# Patient Record
Sex: FEMALE | Race: WHITE
[De-identification: names, ages, dates, MRNs, and addresses within clinical notes are randomized per-mention and may not be internally consistent; named-entity substitution may affect disease eponyms.]

---

## 2017-02-15 ENCOUNTER — HOSPITAL (OUTPATIENT)
Dept: OTHER | Age: 62
End: 2017-02-15
Attending: UROLOGY

## 2018-02-27 PROBLEM — N18.30 CKD (CHRONIC KIDNEY DISEASE) STAGE 3, GFR 30-59 ML/MIN (HCC): Status: ACTIVE | Noted: 2017-01-11

## 2018-02-27 PROBLEM — I73.00 RAYNAUD'S PHENOMENON WITHOUT GANGRENE: Status: ACTIVE | Noted: 2017-01-11

## 2018-02-27 PROBLEM — Z78.9 STATIN INTOLERANCE: Status: ACTIVE | Noted: 2017-07-20

## 2018-02-27 PROBLEM — M19.90 OSTEOARTHRITIS, UNSPECIFIED OSTEOARTHRITIS TYPE, UNSPECIFIED SITE: Status: ACTIVE | Noted: 2017-01-11

## 2019-06-04 PROCEDURE — 84425 ASSAY OF VITAMIN B-1: CPT | Performed by: INTERNAL MEDICINE

## 2019-08-19 PROCEDURE — 36415 COLL VENOUS BLD VENIPUNCTURE: CPT | Performed by: INTERNAL MEDICINE

## 2019-08-19 PROCEDURE — 86803 HEPATITIS C AB TEST: CPT | Performed by: INTERNAL MEDICINE

## 2021-04-10 DIAGNOSIS — Z23 NEED FOR VACCINATION: ICD-10-CM

## 2022-03-01 PROBLEM — N18.30 CKD (CHRONIC KIDNEY DISEASE) STAGE 3, GFR 30-59 ML/MIN (HCC): Status: RESOLVED | Noted: 2017-01-11 | Resolved: 2022-03-01

## 2022-03-01 PROBLEM — E66.09 CLASS 1 OBESITY DUE TO EXCESS CALORIES WITH SERIOUS COMORBIDITY IN ADULT, UNSPECIFIED BMI: Status: ACTIVE | Noted: 2022-03-01

## 2022-03-01 PROBLEM — Z96.652 HISTORY OF LEFT KNEE REPLACEMENT: Status: ACTIVE | Noted: 2022-03-01

## 2022-03-01 PROBLEM — I82.412 ACUTE DEEP VEIN THROMBOSIS (DVT) OF FEMORAL VEIN OF LEFT LOWER EXTREMITY (HCC): Status: ACTIVE | Noted: 2022-03-01

## 2023-02-06 ENCOUNTER — LAB ENCOUNTER (OUTPATIENT)
Dept: LAB | Age: 68
End: 2023-02-06
Attending: INTERNAL MEDICINE
Payer: MEDICARE

## 2023-02-06 DIAGNOSIS — Z01.812 ENCOUNTER FOR PREOPERATIVE SCREENING LABORATORY TESTING FOR COVID-19 VIRUS: ICD-10-CM

## 2023-02-06 DIAGNOSIS — Z20.822 ENCOUNTER FOR PREOPERATIVE SCREENING LABORATORY TESTING FOR COVID-19 VIRUS: ICD-10-CM

## 2023-02-06 RX ORDER — LEVOTHYROXINE SODIUM 0.05 MG/1
50 TABLET ORAL
COMMUNITY

## 2023-02-06 RX ORDER — TERAZOSIN 2 MG/1
2 CAPSULE ORAL NIGHTLY
COMMUNITY

## 2023-02-06 RX ORDER — ASPIRIN 81 MG/1
81 TABLET ORAL DAILY
COMMUNITY

## 2023-02-07 LAB — SARS-COV-2 RNA RESP QL NAA+PROBE: NOT DETECTED

## 2023-02-08 ENCOUNTER — ANESTHESIA EVENT (OUTPATIENT)
Dept: ENDOSCOPY | Facility: HOSPITAL | Age: 68
End: 2023-02-08
Payer: MEDICARE

## 2023-02-08 ENCOUNTER — HOSPITAL ENCOUNTER (OUTPATIENT)
Facility: HOSPITAL | Age: 68
Setting detail: HOSPITAL OUTPATIENT SURGERY
Discharge: HOME | End: 2023-02-08
Attending: INTERNAL MEDICINE | Admitting: INTERNAL MEDICINE
Payer: MEDICARE

## 2023-02-08 ENCOUNTER — APPOINTMENT (OUTPATIENT)
Dept: GENERAL RADIOLOGY | Facility: HOSPITAL | Age: 68
End: 2023-02-08
Attending: INTERNAL MEDICINE
Payer: MEDICARE

## 2023-02-08 ENCOUNTER — ANESTHESIA (OUTPATIENT)
Dept: ENDOSCOPY | Facility: HOSPITAL | Age: 68
End: 2023-02-08
Payer: MEDICARE

## 2023-02-08 VITALS
TEMPERATURE: 97 F | HEIGHT: 67 IN | RESPIRATION RATE: 18 BRPM | HEART RATE: 55 BPM | DIASTOLIC BLOOD PRESSURE: 63 MMHG | WEIGHT: 193 LBS | BODY MASS INDEX: 30.29 KG/M2 | OXYGEN SATURATION: 96 % | SYSTOLIC BLOOD PRESSURE: 151 MMHG

## 2023-02-08 DIAGNOSIS — Z20.822 ENCOUNTER FOR PREOPERATIVE SCREENING LABORATORY TESTING FOR COVID-19 VIRUS: Primary | ICD-10-CM

## 2023-02-08 DIAGNOSIS — Z01.812 ENCOUNTER FOR PREOPERATIVE SCREENING LABORATORY TESTING FOR COVID-19 VIRUS: Primary | ICD-10-CM

## 2023-02-08 LAB — GLUCOSE BLD-MCNC: 150 MG/DL (ref 70–99)

## 2023-02-08 PROCEDURE — 0FCD8ZZ EXTIRPATION OF MATTER FROM PANCREATIC DUCT, VIA NATURAL OR ARTIFICIAL OPENING ENDOSCOPIC: ICD-10-PCS | Performed by: INTERNAL MEDICINE

## 2023-02-08 PROCEDURE — 74330 X-RAY BILE/PANC ENDOSCOPY: CPT | Performed by: INTERNAL MEDICINE

## 2023-02-08 PROCEDURE — 0F7D8DZ DILATION OF PANCREATIC DUCT WITH INTRALUMINAL DEVICE, VIA NATURAL OR ARTIFICIAL OPENING ENDOSCOPIC: ICD-10-PCS | Performed by: INTERNAL MEDICINE

## 2023-02-08 PROCEDURE — 82962 GLUCOSE BLOOD TEST: CPT

## 2023-02-08 PROCEDURE — 0FPB8DZ REMOVAL OF INTRALUMINAL DEVICE FROM HEPATOBILIARY DUCT, VIA NATURAL OR ARTIFICIAL OPENING ENDOSCOPIC: ICD-10-PCS | Performed by: INTERNAL MEDICINE

## 2023-02-08 RX ORDER — NICOTINE POLACRILEX 4 MG
30 LOZENGE BUCCAL
Status: DISCONTINUED | OUTPATIENT
Start: 2023-02-08 | End: 2023-02-08

## 2023-02-08 RX ORDER — DEXTROSE MONOHYDRATE 25 G/50ML
50 INJECTION, SOLUTION INTRAVENOUS
Status: DISCONTINUED | OUTPATIENT
Start: 2023-02-08 | End: 2023-02-08

## 2023-02-08 RX ORDER — SODIUM CHLORIDE, SODIUM LACTATE, POTASSIUM CHLORIDE, CALCIUM CHLORIDE 600; 310; 30; 20 MG/100ML; MG/100ML; MG/100ML; MG/100ML
INJECTION, SOLUTION INTRAVENOUS CONTINUOUS
Status: DISCONTINUED | OUTPATIENT
Start: 2023-02-08 | End: 2023-02-08

## 2023-02-08 RX ORDER — EPHEDRINE SULFATE 50 MG/ML
INJECTION INTRAVENOUS AS NEEDED
Status: DISCONTINUED | OUTPATIENT
Start: 2023-02-08 | End: 2023-02-08 | Stop reason: SURG

## 2023-02-08 RX ORDER — LIDOCAINE HYDROCHLORIDE 10 MG/ML
INJECTION, SOLUTION EPIDURAL; INFILTRATION; INTRACAUDAL; PERINEURAL AS NEEDED
Status: DISCONTINUED | OUTPATIENT
Start: 2023-02-08 | End: 2023-02-08 | Stop reason: SURG

## 2023-02-08 RX ORDER — NICOTINE POLACRILEX 4 MG
15 LOZENGE BUCCAL
Status: DISCONTINUED | OUTPATIENT
Start: 2023-02-08 | End: 2023-02-08

## 2023-02-08 RX ORDER — PHENYLEPHRINE HCL 10 MG/ML
VIAL (ML) INJECTION AS NEEDED
Status: DISCONTINUED | OUTPATIENT
Start: 2023-02-08 | End: 2023-02-08 | Stop reason: SURG

## 2023-02-08 RX ORDER — ONDANSETRON 2 MG/ML
4 INJECTION INTRAMUSCULAR; INTRAVENOUS AS NEEDED
Status: DISCONTINUED | OUTPATIENT
Start: 2023-02-08 | End: 2023-02-08

## 2023-02-08 RX ORDER — LEVOFLOXACIN 5 MG/ML
500 INJECTION, SOLUTION INTRAVENOUS ONCE
Status: COMPLETED | OUTPATIENT
Start: 2023-02-08 | End: 2023-02-08

## 2023-02-08 RX ORDER — NALOXONE HYDROCHLORIDE 0.4 MG/ML
80 INJECTION, SOLUTION INTRAMUSCULAR; INTRAVENOUS; SUBCUTANEOUS AS NEEDED
Status: DISCONTINUED | OUTPATIENT
Start: 2023-02-08 | End: 2023-02-08

## 2023-02-08 RX ORDER — LEVOFLOXACIN 5 MG/ML
INJECTION, SOLUTION INTRAVENOUS
Status: DISCONTINUED
Start: 2023-02-08 | End: 2023-02-08

## 2023-02-08 RX ADMIN — SODIUM CHLORIDE, SODIUM LACTATE, POTASSIUM CHLORIDE, CALCIUM CHLORIDE: 600; 310; 30; 20 INJECTION, SOLUTION INTRAVENOUS at 13:14:00

## 2023-02-08 RX ADMIN — EPHEDRINE SULFATE 5 MG: 50 INJECTION INTRAVENOUS at 12:23:00

## 2023-02-08 RX ADMIN — PHENYLEPHRINE HCL 300 MCG: 10 MG/ML VIAL (ML) INJECTION at 12:45:00

## 2023-02-08 RX ADMIN — SODIUM CHLORIDE, SODIUM LACTATE, POTASSIUM CHLORIDE, CALCIUM CHLORIDE: 600; 310; 30; 20 INJECTION, SOLUTION INTRAVENOUS at 12:04:00

## 2023-02-08 RX ADMIN — LEVOFLOXACIN 500 MG: 5 INJECTION, SOLUTION INTRAVENOUS at 12:10:00

## 2023-02-08 RX ADMIN — PHENYLEPHRINE HCL 100 MCG: 10 MG/ML VIAL (ML) INJECTION at 12:50:00

## 2023-02-08 RX ADMIN — PHENYLEPHRINE HCL 100 MCG: 10 MG/ML VIAL (ML) INJECTION at 12:33:00

## 2023-02-08 RX ADMIN — EPHEDRINE SULFATE 5 MG: 50 INJECTION INTRAVENOUS at 12:21:00

## 2023-02-08 RX ADMIN — EPHEDRINE SULFATE 5 MG: 50 INJECTION INTRAVENOUS at 12:27:00

## 2023-02-08 RX ADMIN — LIDOCAINE HYDROCHLORIDE 50 MG: 10 INJECTION, SOLUTION EPIDURAL; INFILTRATION; INTRACAUDAL; PERINEURAL at 11:58:00

## 2023-02-08 NOTE — OPERATIVE REPORT
[facility] OPERATIVE REPORT   PATIENT NAME: Ildefonso Gordon  MRN: [id number]  DATE OF OPERATION: 2/8/2023  PREOPERATIVE DIAGNOSIS: large, dense pancreatic duct stone s/p ESWL yesterday; here for stone extraction  POSTOPERATIVE DIAGNOSIS:    1. Removal of biliary stent   2. Selective pancreatic duct cannulation with redemonstration of large impacted radioopaque stone with passage of wire around the stone   3. Small stone fragments seen after attempts to remove the stone but large stone unable to be extracted despite attempts at balloon and mechanical lithotripsy using the 4mm hurricaine balloon, 1.5cm and 2.0 cm trapezoid basket and 7fr suhendra stent retriever   4. Obstructed, dilated, tortuous pancreatic duct 2nd stone s/p placement of 5fr x 9cm single pigtail stent for pancreatic ductal drainage  PROCEDURE PERFORMED: Endoscopic Retrograde Cholangiopancreatoscopy with attempt at pancreatic stone extraction and placement of pancreatic stent  SEDATION MEDICATIONS: MAC  PREOPERATIVE MEDS:  Levofloxacin 500 mg IVPB;  500cc lactated Ringer's solution IV  INTRAPROCEDURE MEDS:  indomethacin 100 mg Per rectum  PREPROCEDURE ASSESSMENT: The indication for this procedure is to assess for stone extraction. The patient was identified by myself and nursing staff in the exam room. Informed consent was obtained. The patient was seen in clinic and a full H&P was obtained. On brief physical examination, airway is patent. Chest is clear. Heart has regular rate and rhythm. Abdomen is soft, nontender with good bowel sounds. A medication list was taken by nursing today and reviewed by myself. The patient is an ASA grade 2. PROCEDURE NOTE: The procedure was completed without difficulty. The patient tolerated the procedure well. The side viewing duodenoscope was inserted through the mouth and advanced to the level of the duodenum, 3rd portion.   Visualized portion of the esophagus, stomach including antrum, body, fundus and cardiac, and duodenum were normal.  The side-viewing duodenal scope was introduced into the second portion of duodenum. Previously placed biliary stent was removed with a snare. Cannulation of the pancreatic duct was attempted using hydrotome 44 with a preloaded 035\" guidewire. A dark shadow was seen in the head of the pancreas consistent with a radiopaque pancreatic ductal stone. The guidewire had a hard time going around the stone. Contrast injection was performed revealing a normal caliber pancreatic duct going underneath and around the stone. The guidewire was seen looping around the stone in the pancreatic duct. The cannula was advanced further into the pancreatic duct and with aggressive maneuvers, the guidewire was advanced around the stone and into the dilated upstream pancreatic duct. Further contrast injection revealed a markedly tortuous dilated pancreatic duct measuring 8 mm. Leaving the guidewire in the dorsal pancreatic duct towards the tail, a pancreatic sphincterotomy was performed. Small white stone debris was seen coming through the pancreatic duct. Over the guidewire, trapezoid basket 1.5 cm was used to attempt to remove the stone however the basket cannula could not be advanced upstream around the large pancreatic ductal stone. Attempts to do a open basket sweep failed. We could not trap the stone in the basket. We even tried a 2 cm over-the-wire trapezoid basket. The sphincterotomy site was dilated with a 4 mm HurriCaine balloon. After the balloon dilation, excellent drainage of contrast was seen from the pink the balloon catheter was then advanced over the guidewire around the stone and upstream to the stone. A pull-through to attempt to extract the stone failed. At this point a 7 [place] Soehendra stent retriever was used to crush the stone using a rotating maneuver to turn the metallic retriever. We are able to advance the metal retriever pass the stone.   Still the stone was not able to be shaved or removed. At this point a pancreatic stent was attempted to be placed. A 7 Faroese stent could not be passed around the pancreatic stone but a 5 Faroese stent was able to. We successfully placed a 5 [place] M[place] by 9 cm single pigtail pancreatic stent with excellent drainage of contrast noted. Scope was withdrawn from the patient and patient tolerated the procedure well. FINDINGS  1. Large pancreatic duct stone could not be removed. Small fragments of stone debris was seen coming through the papillary orifice confirming that the stone is infected within the pancreatic duct and not within the pancreatic parenchyma. A pancreatic stent was placed for ductal decompression  RECOMMENDATIONS: repeat high dose ESWL with Dr. Sofia Santos. We will repeat ERCP in 4 weeks to attempt to do a spyglass with EHL to remove the pancreatic duct stone  DISCHARGE:  On discharge, the patient was given an after-visit summary detailing the procedure, findings, followup plans, and an updated medication list.     Thank you very much for the consultation. I really appreciate it.     Amos Petersen MD

## 2023-02-08 NOTE — DISCHARGE INSTRUCTIONS
Home Care Instructions for Endoscopic Retrograde Cholangiopancreatography with Sedation    Diet:  - Resume your regular diet as tolerated unless otherwise instructed. - Start with light meals to minimize bloating.  - Do not drink alcohol today. Medication:  - If you have questions about resuming your normal medications, please contact your Primary Care Physician. Activities:  - Take it easy today. Do not return to work today. - Do not drive today. - Do not operate any machinery today (including kitchen equipment). Endoscopic Retrograde Cholangiopancreatography:  - You may have a sore throat for 2-3 days following the exam. This is normal. Gargling with warm salt water (1/2 tsp salt to 1 glass warm water) or using throat lozenges will help. - If you experience any sharp pain in your neck, abdomen or chest, vomiting of blood, oral temperature over 100 degrees Fahrenheit, light-headedness or dizziness, or any other problems, contact your doctor. **If unable to reach your doctor, please go to the [facility] Emergency Room**    - Your referring physician will receive a full report of your examination.  - If you do not hear from your doctor's office within two weeks of your biopsy, please call them for your results. You may be able to see your laboratory results in [employer] between 4 and 7 business days. In some cases, your physician may not have viewed the results before they are released to [address] If you have questions regarding your results contact the physician who ordered the test/exam by phone or via [address] by choosing \"Ask a Medical Question. \"

## 2023-02-08 NOTE — ANESTHESIA POSTPROCEDURE EVALUATION
[address] [facility] Patient Status:  Hospital Outpatient Surgery   Age/Gender 75year old female MRN [id number]   Location [address] [address] Attending Ligia Cevallos MD   Hosp Day # 0 PCP Andrew Blackburn MD       Anesthesia Post-op Note    ENDOSCOPIC RETROGRADE CHOLANGIOPANCREATOGRAPHY with biliary stent removal, sphincterotomy, hurricane balloon dilation to 4mm, mechanical lithrotripsy, trapezoid basket sweep, and pancreatic stent placement    Procedure Summary     Date: 02/08/23 Room / Location: [address] [place] [address] ENDOSCOPY 01 / [facility] ENDOSCOPY    Anesthesia Start: 1153 Anesthesia Stop: 7543    Procedure: ENDOSCOPIC RETROGRADE CHOLANGIOPANCREATOGRAPHY with biliary stent removal, sphincterotomy, hurricane balloon dilation to 4mm, mechanical lithrotripsy, trapezoid basket sweep, and pancreatic stent placement Diagnosis: (PANCREATIC STONE, PANCREATIC DUCT OBSTRUCTION)    Surgeons: Ligia Cevallos MD Anesthesiologist: Ab Briggs MD    Anesthesia Type: MAC ASA Status: 3          Anesthesia Type: MAC    Vitals Value Taken Time   BP 91/64 02/08/23 1314   Temp  02/08/23 1315   Pulse 73 02/08/23 1314   Resp 18 02/08/23 1314   SpO2 100 % 02/08/23 1314   Vitals shown include unvalidated device data. Patient Location: Endoscopy    Anesthesia Type: MAC    Airway Patency: patent    Postop Pain Control: adequate    Mental Status: mildly sedated but able to meaningfully participate in the post-anesthesia evaluation    Nausea/Vomiting: none    Cardiopulmonary/Hydration status: stable euvolemic    Complications: no apparent anesthesia related complications    Postop vital signs: stable    Dental Exam: Unchanged from Preop    Patient to be discharged home when criteria met.

## 2023-03-13 ENCOUNTER — LAB ENCOUNTER (OUTPATIENT)
Dept: LAB | Age: 68
End: 2023-03-13
Attending: INTERNAL MEDICINE
Payer: MEDICARE

## 2023-03-13 DIAGNOSIS — Z01.818 PRE-OP TESTING: ICD-10-CM

## 2023-03-14 LAB — SARS-COV-2 RNA RESP QL NAA+PROBE: NOT DETECTED

## 2023-03-15 ENCOUNTER — ANESTHESIA EVENT (OUTPATIENT)
Dept: ENDOSCOPY | Facility: HOSPITAL | Age: 68
End: 2023-03-15
Payer: MEDICARE

## 2023-03-15 ENCOUNTER — APPOINTMENT (OUTPATIENT)
Dept: GENERAL RADIOLOGY | Facility: HOSPITAL | Age: 68
End: 2023-03-15
Attending: INTERNAL MEDICINE
Payer: MEDICARE

## 2023-03-15 ENCOUNTER — ANESTHESIA (OUTPATIENT)
Dept: ENDOSCOPY | Facility: HOSPITAL | Age: 68
End: 2023-03-15
Payer: MEDICARE

## 2023-03-15 ENCOUNTER — HOSPITAL ENCOUNTER (OUTPATIENT)
Facility: HOSPITAL | Age: 68
Setting detail: HOSPITAL OUTPATIENT SURGERY
Discharge: HOME | End: 2023-03-15
Attending: INTERNAL MEDICINE | Admitting: INTERNAL MEDICINE
Payer: MEDICARE

## 2023-03-15 VITALS
SYSTOLIC BLOOD PRESSURE: 144 MMHG | TEMPERATURE: 98 F | WEIGHT: 190 LBS | DIASTOLIC BLOOD PRESSURE: 63 MMHG | BODY MASS INDEX: 29.47 KG/M2 | RESPIRATION RATE: 11 BRPM | OXYGEN SATURATION: 98 % | HEART RATE: 48 BPM | HEIGHT: 67.5 IN

## 2023-03-15 DIAGNOSIS — Z01.818 PRE-OP TESTING: Primary | ICD-10-CM

## 2023-03-15 LAB — GLUCOSE BLD-MCNC: 120 MG/DL (ref 70–99)

## 2023-03-15 PROCEDURE — 0FCD8ZZ EXTIRPATION OF MATTER FROM PANCREATIC DUCT, VIA NATURAL OR ARTIFICIAL OPENING ENDOSCOPIC: ICD-10-PCS | Performed by: INTERNAL MEDICINE

## 2023-03-15 PROCEDURE — 0F7D8DZ DILATION OF PANCREATIC DUCT WITH INTRALUMINAL DEVICE, VIA NATURAL OR ARTIFICIAL OPENING ENDOSCOPIC: ICD-10-PCS | Performed by: INTERNAL MEDICINE

## 2023-03-15 PROCEDURE — 0FPD8DZ REMOVAL OF INTRALUMINAL DEVICE FROM PANCREATIC DUCT, VIA NATURAL OR ARTIFICIAL OPENING ENDOSCOPIC: ICD-10-PCS | Performed by: INTERNAL MEDICINE

## 2023-03-15 PROCEDURE — 82962 GLUCOSE BLOOD TEST: CPT

## 2023-03-15 PROCEDURE — 74328 X-RAY BILE DUCT ENDOSCOPY: CPT | Performed by: INTERNAL MEDICINE

## 2023-03-15 DEVICE — ZIMMON PANCREATIC STENT
Type: IMPLANTABLE DEVICE | Status: FUNCTIONAL
Brand: ZIMMON

## 2023-03-15 RX ORDER — NALOXONE HYDROCHLORIDE 0.4 MG/ML
80 INJECTION, SOLUTION INTRAMUSCULAR; INTRAVENOUS; SUBCUTANEOUS AS NEEDED
Status: DISCONTINUED | OUTPATIENT
Start: 2023-03-15 | End: 2023-03-15 | Stop reason: HOSPADM

## 2023-03-15 RX ORDER — ONDANSETRON 2 MG/ML
INJECTION INTRAMUSCULAR; INTRAVENOUS AS NEEDED
Status: DISCONTINUED | OUTPATIENT
Start: 2023-03-15 | End: 2023-03-15 | Stop reason: SURG

## 2023-03-15 RX ORDER — LIDOCAINE HYDROCHLORIDE 10 MG/ML
INJECTION, SOLUTION EPIDURAL; INFILTRATION; INTRACAUDAL; PERINEURAL AS NEEDED
Status: DISCONTINUED | OUTPATIENT
Start: 2023-03-15 | End: 2023-03-15 | Stop reason: SURG

## 2023-03-15 RX ORDER — SODIUM CHLORIDE, SODIUM LACTATE, POTASSIUM CHLORIDE, CALCIUM CHLORIDE 600; 310; 30; 20 MG/100ML; MG/100ML; MG/100ML; MG/100ML
INJECTION, SOLUTION INTRAVENOUS CONTINUOUS
Status: DISCONTINUED | OUTPATIENT
Start: 2023-03-15 | End: 2023-03-15

## 2023-03-15 RX ORDER — NICOTINE POLACRILEX 4 MG
15 LOZENGE BUCCAL
Status: DISCONTINUED | OUTPATIENT
Start: 2023-03-15 | End: 2023-03-15 | Stop reason: HOSPADM

## 2023-03-15 RX ORDER — ROCURONIUM BROMIDE 10 MG/ML
INJECTION, SOLUTION INTRAVENOUS AS NEEDED
Status: DISCONTINUED | OUTPATIENT
Start: 2023-03-15 | End: 2023-03-15 | Stop reason: SURG

## 2023-03-15 RX ORDER — HYDROMORPHONE HYDROCHLORIDE 1 MG/ML
0.4 INJECTION, SOLUTION INTRAMUSCULAR; INTRAVENOUS; SUBCUTANEOUS EVERY 5 MIN PRN
Status: DISCONTINUED | OUTPATIENT
Start: 2023-03-15 | End: 2023-03-15 | Stop reason: HOSPADM

## 2023-03-15 RX ORDER — HYDROMORPHONE HYDROCHLORIDE 1 MG/ML
0.6 INJECTION, SOLUTION INTRAMUSCULAR; INTRAVENOUS; SUBCUTANEOUS EVERY 5 MIN PRN
Status: DISCONTINUED | OUTPATIENT
Start: 2023-03-15 | End: 2023-03-15 | Stop reason: HOSPADM

## 2023-03-15 RX ORDER — DEXAMETHASONE SODIUM PHOSPHATE 4 MG/ML
VIAL (ML) INJECTION AS NEEDED
Status: DISCONTINUED | OUTPATIENT
Start: 2023-03-15 | End: 2023-03-15 | Stop reason: SURG

## 2023-03-15 RX ORDER — NICOTINE POLACRILEX 4 MG
30 LOZENGE BUCCAL
Status: DISCONTINUED | OUTPATIENT
Start: 2023-03-15 | End: 2023-03-15 | Stop reason: HOSPADM

## 2023-03-15 RX ORDER — LEVOFLOXACIN 5 MG/ML
500 INJECTION, SOLUTION INTRAVENOUS ONCE
Status: DISCONTINUED | OUTPATIENT
Start: 2023-03-15 | End: 2023-03-15 | Stop reason: HOSPADM

## 2023-03-15 RX ORDER — HYDROMORPHONE HYDROCHLORIDE 1 MG/ML
0.2 INJECTION, SOLUTION INTRAMUSCULAR; INTRAVENOUS; SUBCUTANEOUS EVERY 5 MIN PRN
Status: DISCONTINUED | OUTPATIENT
Start: 2023-03-15 | End: 2023-03-15 | Stop reason: HOSPADM

## 2023-03-15 RX ORDER — SODIUM CHLORIDE, SODIUM LACTATE, POTASSIUM CHLORIDE, CALCIUM CHLORIDE 600; 310; 30; 20 MG/100ML; MG/100ML; MG/100ML; MG/100ML
INJECTION, SOLUTION INTRAVENOUS CONTINUOUS
Status: DISCONTINUED | OUTPATIENT
Start: 2023-03-15 | End: 2023-03-15 | Stop reason: HOSPADM

## 2023-03-15 RX ORDER — DEXTROSE MONOHYDRATE 25 G/50ML
50 INJECTION, SOLUTION INTRAVENOUS
Status: DISCONTINUED | OUTPATIENT
Start: 2023-03-15 | End: 2023-03-15 | Stop reason: HOSPADM

## 2023-03-15 RX ADMIN — LIDOCAINE HYDROCHLORIDE 50 MG: 10 INJECTION, SOLUTION EPIDURAL; INFILTRATION; INTRACAUDAL; PERINEURAL at 10:43:00

## 2023-03-15 RX ADMIN — DEXAMETHASONE SODIUM PHOSPHATE 4 MG: 4 MG/ML VIAL (ML) INJECTION at 10:47:00

## 2023-03-15 RX ADMIN — ROCURONIUM BROMIDE 50 MG: 10 INJECTION, SOLUTION INTRAVENOUS at 10:43:00

## 2023-03-15 RX ADMIN — ONDANSETRON 4 MG: 2 INJECTION INTRAMUSCULAR; INTRAVENOUS at 10:47:00

## 2023-03-15 NOTE — H&P
[facility] OPERATIVE REPORT   PATIENT NAME: Susan Clarke  MRN: [id number]  DATE OF OPERATION: 3/15/2023  PREOPERATIVE DIAGNOSIS: Large obstructing pancreatic duct stone with chronic ductal changes pancreatitis status post high-energy electric shock wave lithotripsy (ESWL) at [place] and now here for pancreatic duct removal  POSTOPERATIVE DIAGNOSIS:    1.  Large persistent 6 x 8 mm radiolucent pancreatic ductal stone unable to be removed with standard lithotripsy   2. Successful fragmentation and stone extraction with electrohydraulic lithotripsy (EHL) using spyglass DS cholangioscopy   3. High-grade stricture in main pancreatic duct with angulation to the main pancreatic duct in the head of pancreas status post stricture dilation wheezing HurriCaine 4 mm balloon and placement of a 7 fr by 9 cm single pigtail pancreatic stent  PROCEDURE PERFORMED: Endoscopic Retrograde Cholangiopancreatoscopy with cholangioscopy with EHL and stent placement  SEDATION MEDICATIONS: MAC  PREOPERATIVE MEDS:  Levofloxacin 500 mg IVPB;  500cc lactated Ringer's solution IV  INTRAPROCEDURE MEDS:  indomethacin 100 mg Per rectum  PREPROCEDURE ASSESSMENT: The indication for this procedure is to assess for pancreatic duct stone extraction. The patient was identified by myself and nursing staff in the exam room. Informed consent was obtained. The patient was seen in clinic and a full H&P was obtained. On brief physical examination, airway is patent. Chest is clear. Heart has regular rate and rhythm. Abdomen is soft, nontender with good bowel sounds. A medication list was taken by nursing today and reviewed by myself. The patient is an ASA grade 2. PROCEDURE NOTE: The procedure was completed without difficulty. The patient tolerated the procedure well. The side viewing duodenoscope was inserted through the mouth and advanced to the level of the duodenum, 3rd portion.   Visualized portion of the esophagus, stomach including antrum, body, fundus and cardiac, and duodenum were normal.  Placed pancreatic stent was seen. Guidewire could not be placed alongside the pancreatic stent due to presence of a large radiolucent 8 mm stone. We attempted to place a guidewire through the pancreatic stent but this was challenged due to the pigtail nature of the stent. The stent was removed with a hexagonal snare through the scope. Scope was withdrawn from the patient and patient tolerated the procedure well. FINDINGS   1. ***  RECOMMENDATIONS: ***  DISCHARGE:  On discharge, the patient was given an after-visit summary detailing the procedure, findings, followup plans, and an updated medication list.     Thank you very much for the consultation. I really appreciate it.     Edgard Bennett MD

## 2023-03-15 NOTE — OPERATIVE REPORT
[facility] OPERATIVE REPORT   PATIENT NAME: Abiola Cheung  MRN: [id number]  DATE OF OPERATION: 3/15/2023  PREOPERATIVE DIAGNOSIS: Large obstructing pancreatic duct stone with chronic ductal changes pancreatitis status post high-energy electric shock wave lithotripsy (ESWL) at [facility] and now here for pancreatic duct removal  POSTOPERATIVE DIAGNOSIS:    1.  Large persistent 6 x 8 mm radiolucent pancreatic ductal stone unable to be removed with standard lithotripsy   2. Successful fragmentation and stone extraction with electrohydraulic lithotripsy (EHL) using spyglass DS cholangioscopy   3. High-grade stricture in main pancreatic duct with angulation to the main pancreatic duct in the head of pancreas status post stricture dilation wheezing HurriCaine 4 mm balloon and placement of a 7 fr by 9 cm single pigtail pancreatic stent  PROCEDURE PERFORMED: Endoscopic Retrograde Cholangiopancreatoscopy with cholangioscopy with EHL and stent placement  SEDATION MEDICATIONS: MAC  PREOPERATIVE MEDS:  Levofloxacin 500 mg IVPB;  500cc lactated Ringer's solution IV  INTRAPROCEDURE MEDS:  indomethacin 100 mg Per rectum  PREPROCEDURE ASSESSMENT: The indication for this procedure is to assess for pancreatic duct stone extraction. The patient was identified by myself and nursing staff in the exam room. Informed consent was obtained. The patient was seen in clinic and a full H&P was obtained. On brief physical examination, airway is patent. Chest is clear. Heart has regular rate and rhythm. Abdomen is soft, nontender with good bowel sounds. A medication list was taken by nursing today and reviewed by myself. The patient is an ASA grade 2. PROCEDURE NOTE: The procedure was completed without difficulty. The patient tolerated the procedure well. The side viewing duodenoscope was inserted through the mouth and advanced to the level of the duodenum, 3rd portion.   Visualized portion of the esophagus, stomach including antrum, body, fundus and cardiac, and duodenum were normal.  Placed pancreatic stent was seen. Guidewire could not be placed alongside the pancreatic stent due to presence of a large radiolucent 8 mm stone. We attempted to place a guidewire through the pancreatic stent but this was challenged due to the pigtail nature of the stent. The stent was removed with a hexagonal snare through the scope. A straight 035\" guidewire was advanced through a hydrotome 44 cannula. Guidewire had a difficult time going into the dorsal pancreatic duct due to looping around the stone. With some maneuvering, the guidewire was advanced around the stone and into the dorsal pancreatic duct which was injected with contrast.  There was significant irregularities and beading features to suggest chronic pancreatitis. A papillary balloon dilation using a 4 mm HurriCaine balloon was performed. A 2 cm trapezoid basket could not be advanced over the guidewire past the stone but the basket was able to be opened proximal to the stone and sweeping the basket was unsuccessful at removing the stone. Spyglass DS scope was advanced over the guidewire into the pancreatic duct easily. Cholangioscopy was performed revealing a large bright white pancreatic ductal stone obstructing the main pancreatic duct. The guidewire was removed. EHL probe was used to completely fragment the stone using lithotripsy. Large amounts of stone fragments were removed with suctioning and irrigation with saline. We required significant lithotripsy with EHL requiring 2 probes using high capacity pulses and energy. There was no bleeding seen with lithotripsy. Care was taken to make sure the probe was not up against the pancreatic epithelium. There was sharp angulation past the pancreatic duct in the head and towards the neck of the pancreas.   The guidewire was seen to be looping within the main pancreatic duct and could not go past the sharp kink in the neck of the pancreas. There appeared to be a stricture. This was confirmed with a balloon sweep of the main pancreatic duct. The stricture was very short less than 5 mm. The stricture was dilated using a 4 mm HurriCaine balloon. Balloon sweeps thereafter was much easier. No significant bleeding was noted post stricture dilation. Large amounts of irrigation with saline was done to clear all stone fragments. A 7 fr x 9 cm single pigtail pancreatic stent was successfully placed to treat the stricture and for pancreatic duct drainage. Scope was withdrawn from the patient and patient tolerated the procedure well. FINDINGS   1. Successful removal of large pancreatic ductal stone using spyglass/electrohydraulic lithotripsy (EHL). A pancreatic stent was placed. 2. Chronic pancreatitis with stone and pancreatic duct stricture treated with EHL and balloon dilation/stent placement respectively  RECOMMENDATIONS: Repeat ERCP to assess for pancreatic duct stricture improvement. DISCHARGE:  On discharge, the patient was given an after-visit summary detailing the procedure, findings, followup plans, and an updated medication list.     Thank you very much for the consultation. I really appreciate it.     Edgard Bennett MD

## 2023-03-15 NOTE — ANESTHESIA PROCEDURE NOTES
Airway  Date/Time: 3/15/2023 10:43 AM  Urgency: elective    Airway not difficult    General Information and Staff    Patient location during procedure: OR  Anesthesiologist: Chelsey Hopkins MD  Performed: anesthesiologist   Performed by: Chelsey Hopkins MD  Authorized by: Chelsey Hopkins MD      Indications and Patient Condition  Indications for airway management: anesthesia  Sedation level: deep  Preoxygenated: yes  Patient position: sniffing  Mask difficulty assessment: 1 - vent by mask    Final Airway Details  Final airway type: endotracheal airway      Successful airway: ETT  Cuffed: yes   Successful intubation technique: direct laryngoscopy  Endotracheal tube insertion site: oral  Blade: Mony  Blade size: #3  ETT size (mm): 7.0    Cormack-Lehane Classification: grade I - full view of glottis  Placement verified by: chest auscultation and capnometry   Cuff volume (mL): 8  Measured from: lips  ETT to lips (cm): 22  Number of attempts at approach: 1

## 2023-03-15 NOTE — ANESTHESIA POSTPROCEDURE EVALUATION
75 [facility] Patient Status:  Hospital Outpatient Surgery   Age/Gender 75year old female MRN [id number]   Location [address] [facility] Attending Sabrina Serrano MD   Hosp Day # 0 PCP Issac Matta MD       Anesthesia Post-op Note    ENDOSCOPIC RETROGRADE CHOLANGIOPANCREATOGRAPHY WITH removal of pancreatic stent, hurricane balloon dilation to 4mm, spyglass, electrohydraulic lithrotrispy, balloon sweep and pancreatic stent placement    Procedure Summary     Date: 03/15/23 Room / Location: [address] [place] [address] ENDOSCOPY 01 / 1404 [facility] ENDOSCOPY    Anesthesia Start: 1037 Anesthesia Stop: 7624    Procedure: ENDOSCOPIC RETROGRADE CHOLANGIOPANCREATOGRAPHY WITH removal of pancreatic stent, hurricane balloon dilation to 4mm, spyglass, electrohydraulic lithrotrispy, balloon sweep and pancreatic stent placement Diagnosis: (pancreatic duct stone)    Surgeons: Sabrina Serrano MD Anesthesiologist: Haley Figueroa MD    Anesthesia Type: general ASA Status: 3          Anesthesia Type: general    Vitals Value Taken Time   /88 03/15/23 1240   Temp 98 03/15/23 1243   Pulse 57 03/15/23 1242   Resp 14 03/15/23 1242   SpO2 100 % 03/15/23 1242   Vitals shown include unvalidated device data.     Patient Location: PACU    Anesthesia Type: general    Airway Patency: patent    Postop Pain Control: adequate    Mental Status: mildly sedated but able to meaningfully participate in the post-anesthesia evaluation    Nausea/Vomiting: none    Cardiopulmonary/Hydration status: stable euvolemic    Complications: no apparent anesthesia related complications    Postop vital signs: stable    Dental Exam: Unchanged from Preop

## 2023-03-15 NOTE — DISCHARGE INSTRUCTIONS
Home Care Instructions for Gastroscopy with Sedation    Diet:  - Clear diet today, soft diet tomorrow, and resume your regular diet as tolerated on Friday. - Start with light meals to minimize bloating.  - Do not drink alcohol today. Medication:  - If you have questions about resuming your normal medications, please contact your Primary Care Physician. Activities:  - Take it easy today. Do not return to work today. - Do not drive today. - Do not operate any machinery today (including kitchen equipment). Colonoscopy:  - You may notice some rectal \"spotting\" (a little blood on the toilet tissue) for a day or two after the exam. This is normal.  - If you experience any rectal bleeding (not spotting), persistent tenderness or sharp severe abdominal pains, oral temperature over 100 degrees Fahrenheit, light-headedness or dizziness, or any other problems, contact your doctor. Gastroscopy:  - You may have a sore throat for 2-3 days following the exam. This is normal. Gargling with warm salt water (1/2 tsp salt to 1 glass warm water) or using throat lozenges will help. - If you experience any sharp pain in your neck, abdomen or chest, vomiting of blood, oral temperature over 100 degrees Fahrenheit, light-headedness or dizziness, or any other problems, contact your doctor. **If unable to reach your doctor, please go to the [facility] Emergency Room**    - Your referring physician will receive a full report of your examination.  - If you do not hear from your doctor's office within two weeks of your biopsy, please call them for your results. You may be able to see your laboratory results in [facility] between 4 and 7 business days. In some cases, your physician may not have viewed the results before they are released to [address] If you have questions regarding your results contact the physician who ordered the test/exam by phone or via [address] by choosing \"Ask a Medical Question. \"

## 2023-05-16 RX ORDER — SODIUM CHLORIDE, SODIUM LACTATE, POTASSIUM CHLORIDE, CALCIUM CHLORIDE 600; 310; 30; 20 MG/100ML; MG/100ML; MG/100ML; MG/100ML
INJECTION, SOLUTION INTRAVENOUS CONTINUOUS
Status: DISCONTINUED | OUTPATIENT
Start: 2023-05-16 | End: 2023-05-17

## 2023-05-17 ENCOUNTER — ANESTHESIA (OUTPATIENT)
Dept: ENDOSCOPY | Facility: HOSPITAL | Age: 68
End: 2023-05-17
Payer: MEDICARE

## 2023-05-17 ENCOUNTER — HOSPITAL ENCOUNTER (OUTPATIENT)
Facility: HOSPITAL | Age: 68
Setting detail: HOSPITAL OUTPATIENT SURGERY
Discharge: HOME | End: 2023-05-17
Attending: INTERNAL MEDICINE | Admitting: INTERNAL MEDICINE
Payer: MEDICARE

## 2023-05-17 ENCOUNTER — ANESTHESIA EVENT (OUTPATIENT)
Dept: ENDOSCOPY | Facility: HOSPITAL | Age: 68
End: 2023-05-17
Payer: MEDICARE

## 2023-05-17 ENCOUNTER — APPOINTMENT (OUTPATIENT)
Dept: GENERAL RADIOLOGY | Facility: HOSPITAL | Age: 68
End: 2023-05-17
Attending: INTERNAL MEDICINE
Payer: MEDICARE

## 2023-05-17 VITALS
HEART RATE: 59 BPM | TEMPERATURE: 96 F | DIASTOLIC BLOOD PRESSURE: 82 MMHG | SYSTOLIC BLOOD PRESSURE: 98 MMHG | HEIGHT: 67 IN | WEIGHT: 190 LBS | OXYGEN SATURATION: 100 % | RESPIRATION RATE: 16 BRPM | BODY MASS INDEX: 29.82 KG/M2

## 2023-05-17 PROCEDURE — 74330 X-RAY BILE/PANC ENDOSCOPY: CPT | Performed by: INTERNAL MEDICINE

## 2023-05-17 PROCEDURE — 0FCD8ZZ EXTIRPATION OF MATTER FROM PANCREATIC DUCT, VIA NATURAL OR ARTIFICIAL OPENING ENDOSCOPIC: ICD-10-PCS | Performed by: INTERNAL MEDICINE

## 2023-05-17 DEVICE — ZIMMON PANCREATIC STENT
Type: IMPLANTABLE DEVICE | Site: PANCREATIC | Status: FUNCTIONAL
Brand: ZIMMON

## 2023-05-17 RX ORDER — DEXTROSE MONOHYDRATE 25 G/50ML
50 INJECTION, SOLUTION INTRAVENOUS
Status: DISCONTINUED | OUTPATIENT
Start: 2023-05-17 | End: 2023-05-17

## 2023-05-17 RX ORDER — NICOTINE POLACRILEX 4 MG
30 LOZENGE BUCCAL
Status: DISCONTINUED | OUTPATIENT
Start: 2023-05-17 | End: 2023-05-17

## 2023-05-17 RX ORDER — NICOTINE POLACRILEX 4 MG
15 LOZENGE BUCCAL
Status: DISCONTINUED | OUTPATIENT
Start: 2023-05-17 | End: 2023-05-17

## 2023-05-17 RX ORDER — SODIUM CHLORIDE, SODIUM LACTATE, POTASSIUM CHLORIDE, CALCIUM CHLORIDE 600; 310; 30; 20 MG/100ML; MG/100ML; MG/100ML; MG/100ML
INJECTION, SOLUTION INTRAVENOUS CONTINUOUS
Status: DISCONTINUED | OUTPATIENT
Start: 2023-05-17 | End: 2023-05-17

## 2023-05-17 RX ORDER — NALOXONE HYDROCHLORIDE 0.4 MG/ML
80 INJECTION, SOLUTION INTRAMUSCULAR; INTRAVENOUS; SUBCUTANEOUS AS NEEDED
Status: DISCONTINUED | OUTPATIENT
Start: 2023-05-17 | End: 2023-05-17

## 2023-05-17 RX ADMIN — SODIUM CHLORIDE, SODIUM LACTATE, POTASSIUM CHLORIDE, CALCIUM CHLORIDE: 600; 310; 30; 20 INJECTION, SOLUTION INTRAVENOUS at 07:40:00

## 2023-05-17 NOTE — OPERATIVE REPORT
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 Successful removal of multiple small residual pancreatic duct stones with balloon sweep and open basket sweep   2. Extremely ectatic pancreatic duct with mild dilation; no obvious stricture in pancreatic duct noted   3. Placement of prophylactic pancreatic stent (5fr x 7cm single pigtail stent)  PROCEDURE PERFORMED: Endoscopic Retrograde Cholangiopancreatoscopy   SEDATION MEDICATIONS: MAC  PREOPERATIVE MEDS: 500cc lactated Ringer's solution IV  INTRAPROCEDURE MEDS:  indomethacin 100 mg Per rectum  PREPROCEDURE ASSESSMENT: The indication for this procedure is to assess for stone removal. The patient was identified by myself and nursing staff in the exam room. Informed consent was obtained. The patient was seen in clinic and a full H&P was obtained. On brief physical examination, airway is patent. Chest is clear. Heart has regular rate and rhythm. Abdomen is soft, nontender with good bowel sounds. A medication list was taken by nursing today and reviewed by myself. The patient is an ASA grade 2. PROCEDURE NOTE: The procedure was completed without difficulty. The patient tolerated the procedure well. The side viewing duodenoscope was inserted through the mouth and advanced to the level of the duodenum, 3rd portion. Visualized portion of the esophagus, stomach including antrum, body, fundus and cardiac, and duodenum were normal.     The previously placed pancreatic stent was removed with a snare completely. Pancreatic duct was cannulated using a 9-12 mm extraction balloon with a straight 035\" guidewire. Contrast injection revealed filling defects consistent with small stones. A balloon sweep was performed and some stones were extracted.   The balloon popped. A 1.5 cm trapezoid basket was used to perform a open basket sweep and multiple small white stone fragments were removed. An occlusion pancreatogram was performed and no further filling defects were identified. The balloon at 1 cm came out of the prior sphincterotomy fully inflated. There was no pancreatic ductal stricture seen however the pancreatic duct was tortuous and narrowed in some area consistent with chronic pancreatitis changes. Excellent pancreatic drainage was seen after the procedure. A 5 Austrian by 7 cm single pigtail pancreatic stent without internal flanges was placed for drainage. Scope was withdrawn from the patient and patient tolerated the procedure well. FINDINGS   1. Successful removal of all stone debris from the pancreatic duct. The pancreatic duct was focally dilated and narrowed and tortuous consistent with chronic pancreatitis. RECOMMENDATIONS: We will check KUB in 2 weeks to make sure the pancreatic stent has passed otherwise patient will need an endoscopy to remove this  DISCHARGE:  On discharge, the patient was given an after-visit summary detailing the procedure, findings, followup plans, and an updated medication list.     Thank you very much for the consultation. I really appreciate it.     Darline Mendez MD

## 2023-05-17 NOTE — ANESTHESIA POSTPROCEDURE EVALUATION
[facility] Patient Status:  Hospital Outpatient Surgery   Age/Gender 70year old female MRN [id number]   Location [address] [address] Attending Vicente Byrne MD   Hosp Day # 0 PCP Jasbir Cruz MD       Anesthesia Post-op Note    ENDOSCOPIC RETROGRADE CHOLANGIOPANCREATOGRAPHY W/ BALLOON SWEEP AND PANCREATIC STENT EXCHANGE    Procedure Summary     Date: 05/17/23 Room / Location: [address] [place] [address] ENDOSCOPY 03 / 1404 Located within [facility] ENDOSCOPY    Anesthesia Start: 0740 Anesthesia Stop: 5784    Procedure: ENDOSCOPIC RETROGRADE CHOLANGIOPANCREATOGRAPHY W/ BALLOON SWEEP AND PANCREATIC STENT EXCHANGE Diagnosis: (PANCREATIC DUCT STONES)    Surgeons: Vicente Byrne MD Anesthesiologist: Tomas Barrios MD    Anesthesia Type: MAC ASA Status: 3          Anesthesia Type: MAC    Vitals Value Taken Time   BP 88/51 05/17/23 0823   Temp  05/17/23 0823   Pulse 70 05/17/23 0823   Resp 18 05/17/23 0823   SpO2 97 05/17/23 0823       Patient Location: Endoscopy    Anesthesia Type: MAC    Airway Patency: patent    Postop Pain Control: adequate    Mental Status: preanesthetic baseline    Nausea/Vomiting: none    Cardiopulmonary/Hydration status: stable euvolemic    Complications: no apparent anesthesia related complications    Postop vital signs: stable    Dental Exam: Unchanged from Preop    Patient to be discharged home when criteria met.

## 2023-06-15 ENCOUNTER — ANESTHESIA (OUTPATIENT)
Dept: ENDOSCOPY | Facility: HOSPITAL | Age: 68
End: 2023-06-15
Payer: MEDICARE

## 2023-06-15 ENCOUNTER — HOSPITAL ENCOUNTER (OUTPATIENT)
Facility: HOSPITAL | Age: 68
Setting detail: HOSPITAL OUTPATIENT SURGERY
Discharge: HOME | End: 2023-06-15
Attending: INTERNAL MEDICINE | Admitting: INTERNAL MEDICINE
Payer: MEDICARE

## 2023-06-15 ENCOUNTER — ANESTHESIA EVENT (OUTPATIENT)
Dept: ENDOSCOPY | Facility: HOSPITAL | Age: 68
End: 2023-06-15
Payer: MEDICARE

## 2023-06-15 VITALS
DIASTOLIC BLOOD PRESSURE: 69 MMHG | OXYGEN SATURATION: 100 % | HEART RATE: 56 BPM | SYSTOLIC BLOOD PRESSURE: 124 MMHG | BODY MASS INDEX: 28.23 KG/M2 | WEIGHT: 182 LBS | TEMPERATURE: 97 F | HEIGHT: 67.5 IN | RESPIRATION RATE: 14 BRPM

## 2023-06-15 LAB — GLUCOSE BLD-MCNC: 103 MG/DL (ref 70–99)

## 2023-06-15 PROCEDURE — 0FPG4DZ REMOVAL OF INTRALUMINAL DEVICE FROM PANCREAS, PERCUTANEOUS ENDOSCOPIC APPROACH: ICD-10-PCS | Performed by: INTERNAL MEDICINE

## 2023-06-15 PROCEDURE — 82962 GLUCOSE BLOOD TEST: CPT

## 2023-06-15 RX ORDER — LIDOCAINE HYDROCHLORIDE 20 MG/ML
INJECTION, SOLUTION EPIDURAL; INFILTRATION; INTRACAUDAL; PERINEURAL AS NEEDED
Status: DISCONTINUED | OUTPATIENT
Start: 2023-06-15 | End: 2023-06-15 | Stop reason: SURG

## 2023-06-15 RX ORDER — SODIUM CHLORIDE, SODIUM LACTATE, POTASSIUM CHLORIDE, CALCIUM CHLORIDE 600; 310; 30; 20 MG/100ML; MG/100ML; MG/100ML; MG/100ML
INJECTION, SOLUTION INTRAVENOUS CONTINUOUS
Status: DISCONTINUED | OUTPATIENT
Start: 2023-06-15 | End: 2023-06-15

## 2023-06-15 RX ORDER — ACETAMINOPHEN 500 MG
1000 TABLET ORAL ONCE
Status: DISCONTINUED | OUTPATIENT
Start: 2023-06-15 | End: 2023-06-15

## 2023-06-15 RX ADMIN — LIDOCAINE HYDROCHLORIDE 100 MG: 20 INJECTION, SOLUTION EPIDURAL; INFILTRATION; INTRACAUDAL; PERINEURAL at 13:27:00

## 2023-06-15 RX ADMIN — SODIUM CHLORIDE, SODIUM LACTATE, POTASSIUM CHLORIDE, CALCIUM CHLORIDE: 600; 310; 30; 20 INJECTION, SOLUTION INTRAVENOUS at 13:18:00

## 2023-06-15 NOTE — ANESTHESIA POSTPROCEDURE EVALUATION
[facility] Patient Status:  Hospital Outpatient Surgery   Age/Gender 70year old female MRN [id number]   Location [address] [address] Attending Ruby Tellez MD   Hosp Day # 0 PCP Long Whelan MD       Anesthesia Post-op Note    ESOPHAGOGASTRODUODENOSCOPY (EGD) WITH STENT REMOVAL    Procedure Summary     Date: 06/15/23 Room / Location: [facility] ENDOSCOPY 03 / [facility] ENDOSCOPY    Anesthesia Start: 6319 Anesthesia Stop: 7994    Procedure: ESOPHAGOGASTRODUODENOSCOPY (EGD) WITH STENT REMOVAL Diagnosis: (PANCREATIC STENT REMOVAL)    Surgeons: Ruby Tellez MD Anesthesiologist: Chaparrita Marcial MD    Anesthesia Type: MAC ASA Status: 3          Anesthesia Type: MAC    Vitals Value Taken Time   BP 91/52 06/15/23 1341   Temp  06/15/23 1341   Pulse 72 06/15/23 1340   Resp 14 06/15/23 1341   SpO2 89 % 06/15/23 1340   Vitals shown include unvalidated device data. Patient Location: Endoscopy    Anesthesia Type: MAC    Airway Patency: patent    Postop Pain Control: adequate    Mental Status: mildly sedated but able to meaningfully participate in the post-anesthesia evaluation    Nausea/Vomiting: none    Cardiopulmonary/Hydration status: stable euvolemic    Complications: no apparent anesthesia related complications    Postop vital signs: stable    Dental Exam: Unchanged from Preop    Patient to be discharged home when criteria met.

## 2023-07-05 NOTE — OPERATIVE REPORT
OPERATIVE REPORT   PATIENT NAME: Mellisa Clark  MRN: [id number]  DATE OF OPERATION: 6/15/2023  PREOPERATIVE DIAGNOSIS: pancreatic stent migration  POSTOPERATIVE DIAGNOSIS:    1. Successful removal of pancreatic stent  PROCEDURE PERFORMED: Esophagogastroduodenoscopy  SEDATION MEDICATIONS: MAC  MODERATE SEDATION TIME: None. Deep sedation provided by anesthesia  PREPROCEDURE ASSESSMENT: The indication for this procedure is to assess for stent removal. The patient was identified by myself and nursing staff in the exam room. Informed consent was obtained. The patient was seen in clinic and a full H&P was obtained. On brief physical examination, airway is patent. Chest is clear. Heart has regular rate and rhythm. Abdomen is soft, nontender with good bowel sounds. A medication list was taken by nursing today and reviewed by myself. The patient is an ASA grade 2. PROCEDURE NOTE: The procedure was completed without difficulty. The patient tolerated the procedure well. The endoscope was inserted through the mouth and advanced to the level of the duodenum, 3rd portion. Esophagus appeared normal without stricture or esophagitis. No hiatal hernia or Coley's esophagus was noted. Stomach was normal in the antrum and the body. Duodenum was normal in the bulb and 2nd duodenum. Retroflexed view in the stomach revealed normal fundus and cardia. Pancreatic stent was removed with snare. There were no immediate complications. FINDINGS   Successful pancreatic stent removal  RECOMMENDATIONS:   DISCHARGE:  On discharge, the patient was given an after-visit summary detailing the procedure, findings, followup plans, and an updated medication list.     Thank you very much for the consultation. I really appreciate it.     Darian Dave MD

## (undated) DEVICE — PUSHING CATHETER: Brand: COOK

## (undated) DEVICE — RETRIEVAL BALLOON CATHETER: Brand: EXTRACTOR™ PRO RX

## (undated) DEVICE — 1200CC GUARDIAN II: Brand: GUARDIAN

## (undated) DEVICE — OMNIPAQUE 300 POLYB 50ML

## (undated) DEVICE — INFLATION DEVICE DISP

## (undated) DEVICE — 3M™ RED DOT™ MONITORING ELECTRODE WITH FOAM TAPE AND STICKY GEL, 50/BAG, 20/CASE, 72/PLT 2570: Brand: RED DOT™

## (undated) DEVICE — KIT ENDO ORCAPOD 160/180/190

## (undated) DEVICE — INFLATION DEVICE: Brand: ENCORE 26

## (undated) DEVICE — SLEEVE KENDALL SCD EXPRESS MED

## (undated) DEVICE — GLOVE SURG SENSICARE SZ 7

## (undated) DEVICE — ENDOSCOPY PACK - LOWER: Brand: MEDLINE INDUSTRIES, INC.

## (undated) DEVICE — SINGLE USE DISTAL COVER MAJ-2315: Brand: SINGLE USE DISTAL COVER

## (undated) DEVICE — SPHINCTEROTOME: Brand: DREAMTOME™ RX 44

## (undated) DEVICE — FIRSTSTEP 200ML READY2USE GEMI

## (undated) DEVICE — BALLOON DILATATION CATHETER: Brand: HURRICANE™ RX

## (undated) DEVICE — ZIMMON PANCREATIC STENT
Type: IMPLANTABLE DEVICE | Site: PANCREATIC | Status: NON-FUNCTIONAL
Brand: ZIMMON
Removed: 2023-02-08

## (undated) DEVICE — SNARE CAPTIFLEX MICRO-OVL OLY

## (undated) DEVICE — ZIMMON PANCREATIC STENT
Type: IMPLANTABLE DEVICE | Site: PANCREATIC | Status: NON-FUNCTIONAL
Brand: ZIMMON

## (undated) DEVICE — FUSION EXTRACTION BALLOON WITH MULTIPLE SIZING: Brand: FUSION

## (undated) DEVICE — 10FT COMBINED O2 DELIVERY/CO2 MONITORING. FILTER WITH MICROSTREAM TYPE LUER: Brand: DUAL ADULT NASAL CANNULA

## (undated) DEVICE — DEVICE SPEC RTRVL RPTR 2.4MM

## (undated) DEVICE — LOCKING DEVICE RX & BIOPSY CAP

## (undated) DEVICE — FORCEP BIOPSY RJ4 LG CAP W/ND

## (undated) DEVICE — GUIDEWIRE DREAM STR .035 450

## (undated) DEVICE — BLOCK BITE MAXI 60FR

## (undated) DEVICE — ACCESS AND DELIVERY CATHETER: Brand: SPYSCOPE™ DS II

## (undated) DEVICE — SNAPLOC WIRE GUIDE LOCKING DEVICE: Brand: SNAPLOC

## (undated) DEVICE — PROBE LTHTRP 1.9FR 375CM BILI

## (undated) DEVICE — SOEHENDRA STENT RETRIEVER: Brand: SOEHENDRA

## (undated) DEVICE — SNARE CAPTI HEX STIFF SMALL

## (undated) DEVICE — WIREGUIDED RETRIEVAL BASKET: Brand: TRAPEZOID RX

## (undated) DEVICE — CANNULATING SPHINCTEROTOME: Brand: AUTOTOME™ RX 44

## (undated) DEVICE — FILTERLINE NASAL ADULT O2/CO2

## (undated) DEVICE — SNARE CAPTIFLEX STD OVAL OLY

## (undated) DEVICE — FUSION TITAN BILIARY DILATION BALLOON 3.2MM: Brand: FUSION TITAN

## (undated) DEVICE — ACROBAT 2 CALIBRATED TIP WIRE GUIDE: Brand: ACROBAT

## (undated) DEVICE — BIOGUARD CLEANING ADAPTER

## (undated) DEVICE — VISIGLIDE .25 WIRE